# Patient Record
Sex: MALE | Race: WHITE | NOT HISPANIC OR LATINO | Employment: FULL TIME | URBAN - METROPOLITAN AREA
[De-identification: names, ages, dates, MRNs, and addresses within clinical notes are randomized per-mention and may not be internally consistent; named-entity substitution may affect disease eponyms.]

---

## 2017-02-06 ENCOUNTER — OFFICE VISIT (OUTPATIENT)
Dept: URGENT CARE | Facility: PHYSICIAN GROUP | Age: 22
End: 2017-02-06
Payer: COMMERCIAL

## 2017-02-06 VITALS
TEMPERATURE: 98.1 F | WEIGHT: 276 LBS | HEIGHT: 74 IN | OXYGEN SATURATION: 97 % | SYSTOLIC BLOOD PRESSURE: 102 MMHG | DIASTOLIC BLOOD PRESSURE: 72 MMHG | RESPIRATION RATE: 16 BRPM | HEART RATE: 90 BPM | BODY MASS INDEX: 35.42 KG/M2

## 2017-02-06 DIAGNOSIS — J03.90 ACUTE TONSILLITIS, UNSPECIFIED ETIOLOGY: ICD-10-CM

## 2017-02-06 PROCEDURE — 99214 OFFICE O/P EST MOD 30 MIN: CPT | Performed by: FAMILY MEDICINE

## 2017-02-06 RX ORDER — AMOXICILLIN 500 MG/1
500 CAPSULE ORAL 3 TIMES DAILY
Qty: 30 CAP | Refills: 0 | Status: SHIPPED | OUTPATIENT
Start: 2017-02-06

## 2017-02-06 ASSESSMENT — PAIN SCALES - GENERAL: PAINLEVEL: 7=MODERATE-SEVERE PAIN

## 2017-02-06 ASSESSMENT — ENCOUNTER SYMPTOMS
HOARSE VOICE: 0
STRIDOR: 1
SWOLLEN GLANDS: 1

## 2017-02-06 NOTE — PATIENT INSTRUCTIONS
Tonsillitis  Tonsillitis is an infection of the throat that causes the tonsils to become red, tender, and swollen. Tonsils are collections of lymphoid tissue at the back of the throat. Each tonsil has crevices (crypts). Tonsils help fight nose and throat infections and keep infection from spreading to other parts of the body for the first 18 months of life.   CAUSES  Sudden (acute) tonsillitis is usually caused by infection with streptococcal bacteria. Long-lasting (chronic) tonsillitis occurs when the crypts of the tonsils become filled with pieces of food and bacteria, which makes it easy for the tonsils to become repeatedly infected.  SYMPTOMS   Symptoms of tonsillitis include:  · A sore throat, with possible difficulty swallowing.  · White patches on the tonsils.  · Fever.  · Tiredness.  · New episodes of snoring during sleep, when you did not snore before.  · Small, foul-smelling, yellowish-white pieces of material (tonsilloliths) that you occasionally cough up or spit out. The tonsilloliths can also cause you to have bad breath.  DIAGNOSIS  Tonsillitis can be diagnosed through a physical exam. Diagnosis can be confirmed with the results of lab tests, including a throat culture.  TREATMENT   The goals of tonsillitis treatment include the reduction of the severity and duration of symptoms and prevention of associated conditions. Symptoms of tonsillitis can be improved with the use of steroids to reduce the swelling. Tonsillitis caused by bacteria can be treated with antibiotic medicines. Usually, treatment with antibiotic medicines is started before the cause of the tonsillitis is known. However, if it is determined that the cause is not bacterial, antibiotic medicines will not treat the tonsillitis. If attacks of tonsillitis are severe and frequent, your health care provider may recommend surgery to remove the tonsils (tonsillectomy).  HOME CARE INSTRUCTIONS   · Rest as much as possible and get plenty of  sleep.  · Drink plenty of fluids. While the throat is very sore, eat soft foods or liquids, such as sherbet, soups, or instant breakfast drinks.  · Eat frozen ice pops.  · Gargle with a warm or cold liquid to help soothe the throat. Mix 1/4 teaspoon of salt and 1/4 teaspoon of baking soda in 8 oz of water.  SEEK MEDICAL CARE IF:   · Large, tender lumps develop in your neck.  · A rash develops.  · A green, yellow-brown, or bloody substance is coughed up.  · You are unable to swallow liquids or food for 24 hours.  · You notice that only one of the tonsils is swollen.  SEEK IMMEDIATE MEDICAL CARE IF:   · You develop any new symptoms such as vomiting, severe headache, stiff neck, chest pain, or trouble breathing or swallowing.  · You have severe throat pain along with drooling or voice changes.  · You have severe pain, unrelieved with recommended medications.  · You are unable to fully open the mouth.  · You develop redness, swelling, or severe pain anywhere in the neck.  · You have a fever.  MAKE SURE YOU:   · Understand these instructions.  · Will watch your condition.  · Will get help right away if you are not doing well or get worse.     This information is not intended to replace advice given to you by your health care provider. Make sure you discuss any questions you have with your health care provider.     Document Released: 09/27/2006 Document Revised: 01/08/2016 Document Reviewed: 06/06/2014  [employer] Interactive Patient Education ©2016 [employer]vier Inc.

## 2017-02-06 NOTE — MR AVS SNAPSHOT
"        Jaden Sneedfrandy   2017 10:45 AM   Office Visit   MRN: [id number]    Department:  [facility]   Dept Phone:  [phone]    Description:  Male : 1995   Provider:  Malick Holt M.D.           Reason for Visit     Sore Throat swollen tonsils x2 weeks, cough x1 month      Allergies as of 2017     No Known Allergies      You were diagnosed with     Acute tonsillitis, unspecified etiology   [[id number]]         Vital Signs     Blood Pressure Pulse Temperature Respirations Height Weight    102/72 mmHg 90 36.7 °C (98.1 °F) 16 1.88 m (6' 2\") 125.193 kg (276 lb)    Body Mass Index Oxygen Saturation Smoking Status             35.42 kg/m2 97% Former Smoker         Basic Information     Date Of Birth Sex Race Ethnicity Preferred Language    1995 Male White Non- English      Health Maintenance        Date Due Completion Dates    IMM HEP B VACCINE (1 of 3 - Primary Series) 1995 ---    IMM HEP A VACCINE (1 of 2 - Standard Series) 3/3/1994 ---    IMM HPV VACCINE (1 of 3 - Male 3 Dose Series) 3/3/2004 ---    IMM VARICELLA (CHICKENPOX) VACCINE (1 of 2 - 2 Dose Adolescent Series) 3/3/2006 ---    IMM DTaP/Tdap/Td Vaccine (1 - Tdap) 3/3/2012 ---    IMM INFLUENZA (1) 2016 ---            Current Immunizations     No immunizations on file.      Below and/or attached are the medications your provider expects you to take. Review all of your home medications and newly ordered medications with your provider and/or pharmacist. Follow medication instructions as directed by your provider and/or pharmacist. Please keep your medication list with you and share with your provider. Update the information when medications are discontinued, doses are changed, or new medications (including over-the-counter products) are added; and carry medication information at all times in the event of emergency situations     Allergies:  No Known Allergies          Medications  Valid as of: 2017 - 11:14 AM   "    Generic Name Brand Name Tablet Size Instructions for use    Amoxicillin (Cap) AMOXIL 500 MG Take 1 Cap by mouth 3 times a day.        Amoxicillin-Pot Clavulanate (Tab) AUGMENTIN 875-125 MG Take 1 Tab by mouth 2 times a day.        Hydrocodone-Acetaminophen (Tab) NORCO 5-325 MG Take 1-2 Tabs by mouth every 6 hours as needed.        .                 Medicines prescribed today were sent to:     [facility] [address] [place] [address] [place] - [address] [place] [address]    Phone: [phone] Fax: [phone]    Open 24 Hours?: No      Medication refill instructions:       If your prescription bottle indicates you have medication refills left, it is not necessary to call your provider’s office. Please contact your pharmacy and they will refill your medication.    If your prescription bottle indicates you do not have any refills left, you may request refills at any time through one of the following ways: The online [employer] system (except Urgent Care), by calling your provider’s office, or by asking your pharmacy to contact your provider’s office with a refill request. Medication refills are processed only during regular business hours and may not be available until the next business day. Your provider may request additional information or to have a follow-up visit with you prior to refilling your medication.   *Please Note: Medication refills are assigned a new Rx number when refilled electronically. Your pharmacy may indicate that no refills were authorized even though a new prescription for the same medication is available at the pharmacy. Please request the medicine by name with the pharmacy before contacting your provider for a refill.        Instructions    Tonsillitis  Tonsillitis is an infection of the throat that causes the tonsils to become red, tender, and swollen. Tonsils are collections of lymphoid tissue at the back of the throat. Each tonsil has crevices (crypts). Tonsils help  fight nose and throat infections and keep infection from spreading to other parts of the body for the first 18 months of life.   CAUSES  Sudden (acute) tonsillitis is usually caused by infection with streptococcal bacteria. Long-lasting (chronic) tonsillitis occurs when the crypts of the tonsils become filled with pieces of food and bacteria, which makes it easy for the tonsils to become repeatedly infected.  SYMPTOMS   Symptoms of tonsillitis include:  · A sore throat, with possible difficulty swallowing.  · White patches on the tonsils.  · Fever.  · Tiredness.  · New episodes of snoring during sleep, when you did not snore before.  · Small, foul-smelling, yellowish-white pieces of material (tonsilloliths) that you occasionally cough up or spit out. The tonsilloliths can also cause you to have bad breath.  DIAGNOSIS  Tonsillitis can be diagnosed through a physical exam. Diagnosis can be confirmed with the results of lab tests, including a throat culture.  TREATMENT   The goals of tonsillitis treatment include the reduction of the severity and duration of symptoms and prevention of associated conditions. Symptoms of tonsillitis can be improved with the use of steroids to reduce the swelling. Tonsillitis caused by bacteria can be treated with antibiotic medicines. Usually, treatment with antibiotic medicines is started before the cause of the tonsillitis is known. However, if it is determined that the cause is not bacterial, antibiotic medicines will not treat the tonsillitis. If attacks of tonsillitis are severe and frequent, your health care provider may recommend surgery to remove the tonsils (tonsillectomy).  HOME CARE INSTRUCTIONS   · Rest as much as possible and get plenty of sleep.  · Drink plenty of fluids. While the throat is very sore, eat soft foods or liquids, such as sherbet, soups, or instant breakfast drinks.  · Eat frozen ice pops.  · Gargle with a warm or cold liquid to help soothe the throat. Mix  1/4 teaspoon of salt and 1/4 teaspoon of baking soda in 8 oz of water.  SEEK MEDICAL CARE IF:   · Large, tender lumps develop in your neck.  · A rash develops.  · A green, yellow-brown, or bloody substance is coughed up.  · You are unable to swallow liquids or food for 24 hours.  · You notice that only one of the tonsils is swollen.  SEEK IMMEDIATE MEDICAL CARE IF:   · You develop any new symptoms such as vomiting, severe headache, stiff neck, chest pain, or trouble breathing or swallowing.  · You have severe throat pain along with drooling or voice changes.  · You have severe pain, unrelieved with recommended medications.  · You are unable to fully open the mouth.  · You develop redness, swelling, or severe pain anywhere in the neck.  · You have a fever.  MAKE SURE YOU:   · Understand these instructions.  · Will watch your condition.  · Will get help right away if you are not doing well or get worse.     This information is not intended to replace advice given to you by your health care provider. Make sure you discuss any questions you have with your health care provider.     Document Released: 09/27/2006 Document Revised: 01/08/2016 Document Reviewed: 06/06/2014  [employer] Interactive Patient Education ©2016 Elsevier Inc.            SumAll Access Code: [email or web address]  Expires: 3/8/2017 10:43 AM    SumAll  A secure, online tool to manage your health information     [employer]’s SumAll® is a secure, online tool that connects you to your personalized health information from the privacy of your home -- day or night - making it very easy for you to manage your healthcare. Once the activation process is completed, you can even access your medical information using the SumAll tayo, which is available for free in the Apple Tayo store or Google Play store.     SumAll provides the following levels of access (as shown below):   My Chart Features   [facility] Doctor [facility]  Specialists [facility]  Urgent  Care  [facility]  Primary [facility]  Doctor   Email your healthcare team securely and privately 24/7 X X X    Manage appointments: schedule your next appointment; view details of past/upcoming appointments X      Request prescription refills. X      View recent personal medical records, including lab and immunizations X X X X   View health record, including health history, allergies, medications X X X X   Read reports about your outpatient visits, procedures, consult and ER notes X X X X   See your discharge summary, which is a recap of your hospital and/or ER visit that includes your diagnosis, lab results, and care plan. X X       How to register for [employer]:  1. Go to  [email or web address].  2. Click on the Sign Up Now box, which takes you to the New Member Sign Up page. You will need to provide the following information:  a. Enter your [employer] Access Code exactly as it appears at the top of this page. (You will not need to use this code after you’ve completed the sign-up process. If you do not sign up before the expiration date, you must request a new code.)   b. Enter your date of birth.   c. Enter your home email address.   d. Click Submit, and follow the next screen’s instructions.  3. Create a [employer] ID. This will be your [employer] login ID and cannot be changed, so think of one that is secure and easy to remember.  4. Create a [employer] password. You can change your password at any time.  5. Enter your Password Reset Question and Answer. This can be used at a later time if you forget your password.   6. Enter your e-mail address. This allows you to receive e-mail notifications when new information is available in [employer].  7. Click Sign Up. You can now view your health information.    For assistance activating your [employer] account, call [phone]

## 2017-02-06 NOTE — PROGRESS NOTES
"Subjective:      Jaden Caro is a 21 y.o. male who presents with Sore Throat            Pharyngitis   This is a new problem. The current episode started 1 to 4 weeks ago. The problem has been rapidly worsening. The pain is severe. Associated symptoms include stridor and swollen glands. Pertinent negatives include no hoarse voice.       Review of Systems   HENT: Negative for hoarse voice.    Respiratory: Positive for stridor.      No Known Allergies      Objective:     /72 mmHg  Pulse 90  Temp(Src) 36.7 °C (98.1 °F)  Resp 16  Ht 1.88 m (6' 2\")  Wt 125.193 kg (276 lb)  BMI 35.42 kg/m2  SpO2 97%     Physical Exam   Constitutional: He is oriented to person, place, and time. He appears well-developed and well-nourished. No distress.   HENT:   Head: Normocephalic and atraumatic.   Mouth/Throat: Uvula is midline. Oropharyngeal exudate, posterior oropharyngeal edema and posterior oropharyngeal erythema present. No tonsillar abscesses.   Eyes: Conjunctivae and EOM are normal. Pupils are equal, round, and reactive to light.   Cardiovascular: Normal rate, regular rhythm and intact distal pulses.    No murmur heard.  Pulmonary/Chest: Effort normal and breath sounds normal. No respiratory distress.   Abdominal: Soft. He exhibits no distension. There is no tenderness.   Neurological: He is alert and oriented to person, place, and time. He has normal reflexes. No sensory deficit.   Skin: Skin is warm and dry.   Psychiatric: He has a normal mood and affect. His behavior is normal.   Vitals reviewed.              Assessment/Plan:     1. Acute tonsillitis, unspecified etiology  Differential diagnosis, natural history, supportive care, and indications for immediate follow-up discussed.   - amoxicillin (AMOXIL) 500 MG Cap; Take 1 Cap by mouth 3 times a day.  Dispense: 30 Cap; Refill: 0        "

## 2018-06-11 ENCOUNTER — APPOINTMENT (OUTPATIENT)
Dept: RADIOLOGY | Facility: MEDICAL CENTER | Age: 23
End: 2018-06-11
Attending: EMERGENCY MEDICINE
Payer: COMMERCIAL

## 2018-06-11 ENCOUNTER — HOSPITAL ENCOUNTER (EMERGENCY)
Facility: MEDICAL CENTER | Age: 23
End: 2018-06-11
Attending: EMERGENCY MEDICINE
Payer: COMMERCIAL

## 2018-06-11 VITALS
RESPIRATION RATE: 16 BRPM | DIASTOLIC BLOOD PRESSURE: 89 MMHG | HEART RATE: 69 BPM | WEIGHT: 295 LBS | BODY MASS INDEX: 37.86 KG/M2 | SYSTOLIC BLOOD PRESSURE: 132 MMHG | HEIGHT: 74 IN | OXYGEN SATURATION: 97 % | TEMPERATURE: 98.6 F

## 2018-06-11 DIAGNOSIS — M25.531 RIGHT WRIST PAIN: ICD-10-CM

## 2018-06-11 PROCEDURE — 73110 X-RAY EXAM OF WRIST: CPT | Mod: RT

## 2018-06-11 PROCEDURE — 99283 EMERGENCY DEPT VISIT LOW MDM: CPT

## 2018-06-11 NOTE — ED PROVIDER NOTES
ED Provider Note    Scribed for Alonzo Edmond M.D. by Paul Harris. 6/11/2018  2:36 PM    Primary care provider: Pcp Pt States None  Means of arrival: walk in   History obtained from: Patient  History limited by: None    CHIEF COMPLAINT  Chief Complaint   Patient presents with   • Wrist Pain     R, no trauma, pain since Saturday       HPI  Jaden Caro is a 23 y.o. male who presents to the Emergency Department with right wrist pain and swelling worsened with movement onset two days ago. The patient reports that has no history of trauma to the right wrist but reports that he has been using the wrist often as he has been working on building a house. Patient reports that after the pain started he bought a brace which he has been using. He reports that he has been taking some Ibuprofen and maloxicain for his pain. Patient denies any trauma, numbness, tinging at this time.     REVIEW OF SYSTEMS  Pertinent positives include right wrist pain and swelling. Pertinent negatives include no trauma, numbness, tinging.      PAST MEDICAL HISTORY   No pertinent past medical history    SURGICAL HISTORY   has a past surgical history that includes appendectomy (age 10).    SOCIAL HISTORY  Social History   Substance Use Topics   • Smoking status: Former Smoker     Years: 1.00     Types: Cigarettes   • Smokeless tobacco: Never Used   • Alcohol use No      Comment: rare      History   Drug Use No       FAMILY HISTORY  Family History   Problem Relation Age of Onset   • Diabetes Father    • Heart Disease Father    • Stroke Father    • Diabetes Paternal Uncle    • Diabetes Paternal Grandfather    • Cancer Neg Hx        CURRENT MEDICATIONS  Home Medications     Reviewed by Navya Olguin R.N. (Registered Nurse) on 06/11/18 at 1411  Med List Status: Complete   Medication Last Dose Status   amoxicillin (AMOXIL) 500 MG Cap  Active              ALLERGIES  No Known Allergies    RADIOLOGY  DX-WRIST-COMPLETE 3+ RIGHT   Final Result     "  Tiny ossific fragment projecting dorsal to the carpal bones may be related to prior trauma. Triquetral bone avulsion injury could have a similar appearance.        The radiologist's interpretation of all radiological studies have been reviewed by me.    PHYSICAL EXAM  VITAL SIGNS: /75   Pulse 65   Temp 37 °C (98.6 °F)   Resp 18   Ht 1.88 m (6' 2\")   Wt (!) 133.8 kg (295 lb)   SpO2 97%   BMI 37.88 kg/m²     Constitutional: Well developed, Well nourished, no distress, Non-toxic appearance.   HENT: Normocephalic, Atraumatic.  Oropharynx moist.   Eyes: PERRL, EOMI, Conjunctiva normal, No discharge.   CV: Good pulses  Thorax & Lungs: No respiratory distress.   Skin: Warm, Dry, No erythema, No rash.    Musculoskeletal: Mild joint effusion right wrist with tenderness to palpation over dorsum of the wrist and increased pain over ulnar portion of the wrist. No major deformities noted.   Neurologic: Awake, alert. Moves all extremities spontaneously.  Psychiatric: Affect normal, Mood normal.    COURSE & MEDICAL DECISION MAKING  Nursing notes, VS, PMSFHx reviewed in chart.    2:36 PM - Patient seen and examined at bedside. Patient presented here with right wrist pain and swelling with no reported trauma. Ordered right wrist x-ray to evaluate his symptoms. I spoke to the patient about pain control and rotating Motrin and Ibuprofen for a week and then follow up with orthopedics if his symptoms persist. Patient was agreeable with his plan of care. Differential diagnosis include fracture, tendonitis.     4:48 PM I reevaluated the patient at bedside and his wrist showed no fractures. The patient was agreeable with discharge home at this time.     Decision Making:  Wrist sprain, strain, overuse.  X-rays are unremarkable, will put the patient in a Velcro wrist splint, have the patient follow-up with Dr. Crump as an outpatient.    The patient will return for new or worsening symptoms and is stable at the time of " discharge.    The patient is referred to a primary physician for blood pressure management, diabetic screening, and for all other preventative health concerns.    DISPOSITION:  Patient will be discharged home in stable condition.    FOLLOW UP:  [facility], Emergency Dept  [address] [address] [phone]    If symptoms worsen    Mychal Crump M.D.  [address]  [facility] [address]  [phone]          FINAL IMPRESSION  1. Right wrist pain          IPaul (Scribe), am scribing for, and in the presence of, Alonzo Edmond M.D..    Electronically signed by: Paul Harris (Scribe), 6/11/2018    IAlonzo M.D. personally performed the services described in this documentation, as scribed by Paul Harris in my presence, and it is both accurate and complete.    The note accurately reflects work and decisions made by me.  Alonzo Edmond  6/11/2018  9:34 PM

## 2018-06-11 NOTE — ED TRIAGE NOTES
"Chief Complaint   Patient presents with   • Wrist Pain     R, no trauma, pain since Saturday     Pt reports dull ache and shooting pain with movement. Pt wearing splint from home. Denies numbness/tingling.    /75   Pulse 65   Temp 37 °C (98.6 °F)   Resp 18   Ht 1.88 m (6' 2\")   Wt (!) 133.8 kg (295 lb)   SpO2 97%   BMI 37.88 kg/m²     Pt Informed regarding triage process and verbalized understanding to inform triage tech or RN for any changes in condition.  Placed in lobby.    "

## 2018-06-11 NOTE — DISCHARGE INSTRUCTIONS
Wrist Pain, Adult  There are many things that can cause wrist pain. Some common causes include:  · An injury to the wrist area.  · Overuse of the joint.  · A condition that causes too much pressure to be put on a nerve in the wrist (carpal tunnel syndrome).  · Wear and tear of the joints that happens as a person gets older (osteoarthritis).  · Other types of arthritis.  Sometimes, the cause of wrist pain is not known. Often, the pain goes away when you follow your doctor’s instructions for helping pain at home, such as resting or icing your wrist. If your wrist pain does not go away, it is important to tell your doctor.  Follow these instructions at home:  · Rest the wrist area for 48 hours or more, or as long as told by your doctor.  · If a splint or elastic bandage has been put on your wrist, use it as told by your doctor.  ¨ Take off the splint or bandage only as told by your doctor.  ¨ Loosen the splint or bandage if your fingers tingle, lose feeling (get numb), or turn cold or blue.  · If directed, apply ice to the injured area:  ¨ If you have a removable splint or elastic bandage, remove it as told by your doctor.  ¨ Put ice in a plastic bag.  ¨ Place a towel between your skin and the bag or between your splint or bandage and the bag.  ¨ Leave the ice on for 20 minutes, 2-3 times a day.  · Keep your arm raised (elevated) above the level of your heart while you are sitting or lying down.  · Take over-the-counter and prescription medicines only as told by your doctor.  · Keep all follow-up visits as told by your doctor. This is important.  Contact a doctor if:  · You have a sudden sharp pain in the wrist, hand, or arm that is different or new.  · The swelling or bruising on your wrist or hand gets worse.  · Your skin becomes red, gets a rash, or has open sores.  · Your pain does not get better or it gets worse.  Get help right away if:  · You lose feeling in your fingers or hand.  · Your fingers turn white, very  red, or cold and blue.  · You cannot move your fingers.  · You have a fever or chills.  This information is not intended to replace advice given to you by your health care provider. Make sure you discuss any questions you have with your health care provider.  Document Released: 06/05/2009 Document Revised: 07/13/2017 Document Reviewed: 07/06/2017  [employer] Interactive Patient Education © 2017 Elsevier Inc.

## 2018-06-11 NOTE — ED TRIAGE NOTES
Right wrist pain with movement. Pt were a brace that he bought after pain started. Denies injury, but has been using wrist often to build a house.  Mild swelling noted.

## 2019-01-14 ENCOUNTER — OFFICE VISIT (OUTPATIENT)
Dept: URGENT CARE | Facility: PHYSICIAN GROUP | Age: 24
End: 2019-01-14
Payer: COMMERCIAL

## 2019-01-14 ENCOUNTER — HOSPITAL ENCOUNTER (OUTPATIENT)
Facility: MEDICAL CENTER | Age: 24
End: 2019-01-14
Attending: EMERGENCY MEDICINE
Payer: COMMERCIAL

## 2019-01-14 VITALS
TEMPERATURE: 97.3 F | BODY MASS INDEX: 34.01 KG/M2 | HEIGHT: 74 IN | HEART RATE: 91 BPM | WEIGHT: 265 LBS | DIASTOLIC BLOOD PRESSURE: 74 MMHG | OXYGEN SATURATION: 96 % | SYSTOLIC BLOOD PRESSURE: 124 MMHG | RESPIRATION RATE: 12 BRPM

## 2019-01-14 DIAGNOSIS — J02.9 PHARYNGITIS, UNSPECIFIED ETIOLOGY: ICD-10-CM

## 2019-01-14 LAB
INT CON NEG: NEGATIVE
INT CON POS: POSITIVE
S PYO AG THROAT QL: NEGATIVE

## 2019-01-14 PROCEDURE — 87070 CULTURE OTHR SPECIMN AEROBIC: CPT

## 2019-01-14 PROCEDURE — 87880 STREP A ASSAY W/OPTIC: CPT | Performed by: EMERGENCY MEDICINE

## 2019-01-14 PROCEDURE — 99213 OFFICE O/P EST LOW 20 MIN: CPT | Performed by: EMERGENCY MEDICINE

## 2019-01-14 ASSESSMENT — ENCOUNTER SYMPTOMS
STRIDOR: 0
ABDOMINAL PAIN: 0
EYE DISCHARGE: 0
CHILLS: 0
NERVOUS/ANXIOUS: 0
BACK PAIN: 0
NECK PAIN: 1
NAUSEA: 0
VOMITING: 0
SORE THROAT: 1
SPEECH CHANGE: 0
EYE REDNESS: 0
FEVER: 0
SENSORY CHANGE: 0

## 2019-01-14 NOTE — PROGRESS NOTES
"Subjective:      Jaden Caro is a 23 y.o. male who presents with Pharyngitis (x5days )            HPI   Pt with 5 days of sore throat  pmh of strep patient denies any fever chills nausea vomiting or diarrhea.    PMH:  has no past medical history on file.  MEDS:   Current Outpatient Prescriptions:   •  amoxicillin (AMOXIL) 500 MG Cap, Take 1 Cap by mouth 3 times a day. (Patient not taking: Reported on 1/14/2019), Disp: 30 Cap, Rfl: 0  ALLERGIES: No Known Allergies  SURGHX:   Past Surgical History:   Procedure Laterality Date   • APPENDECTOMY  age 10     SOCHX:  reports that he has quit smoking. His smoking use included Cigarettes. He quit after 1.00 year of use. He has never used smokeless tobacco. He reports that he does not drink alcohol or use drugs.  FH: Reviewed with patient, not pertinent to this visit.   Review of Systems   Constitutional: Negative for chills and fever.   HENT: Positive for sore throat. Negative for congestion.    Eyes: Negative for discharge and redness.   Respiratory: Negative for stridor.    Cardiovascular: Negative for chest pain.   Gastrointestinal: Negative for abdominal pain, nausea and vomiting.   Musculoskeletal: Positive for neck pain. Negative for back pain.   Skin: Negative for rash.   Neurological: Negative for sensory change and speech change.   Psychiatric/Behavioral: The patient is not nervous/anxious.           Objective:     /74   Pulse 91   Temp 36.3 °C (97.3 °F) (Temporal)   Resp 12   Ht 1.88 m (6' 2\")   Wt 120.2 kg (265 lb)   SpO2 96%   BMI 34.02 kg/m²      Physical Exam   Constitutional: He appears well-developed and well-nourished. No distress.   HENT:   Head: Normocephalic and atraumatic.   Right Ear: External ear normal.   Eyes: Conjunctivae are normal. Right eye exhibits no discharge. Left eye exhibits no discharge.   Neck: Neck supple.   Cardiovascular: Normal rate and regular rhythm.    Pulmonary/Chest: Effort normal and breath sounds normal. No " stridor.   Abdominal: He exhibits no distension. There is no tenderness.   Musculoskeletal: Normal range of motion.   Lymphadenopathy:     He has cervical adenopathy.   Skin: Skin is warm and dry. No erythema.   Psychiatric: He has a normal mood and affect.   Nursing note reviewed.           Strep neg     Throat culture pending    Mononucleosis titer pending  Assessment/Plan:     Diagnosis: Acute pharyngitis      I am recommending the patient initiate/ continue hydration efforts including the use of a vaporizer/humidifier/ netti pot. I also recommend the pt, initiate Mucinex.. In addition the patient will initiate the prescribed prescription medication/s: Patient will be given lidocaine viscous as needed dysphasia I will call her if her throat culture is positive.  If her symptoms last more than 10 days I recommend she get an outpatient mononucleosis study has been ordered.. If the patient's condition exacerbates with worsening dysphagia, shortness of breath, uncontrolled fever, headache or chest pressure he/she will return immediately to the urgent care or go to  the emergency department for further evaluation.    Jesus Reddy      .

## 2019-01-16 LAB
BACTERIA SPEC RESP CULT: NORMAL
SIGNIFICANT IND 70042: NORMAL
SITE SITE: NORMAL
SOURCE SOURCE: NORMAL

## 2019-03-11 ENCOUNTER — APPOINTMENT (OUTPATIENT)
Dept: RADIOLOGY | Facility: MEDICAL CENTER | Age: 24
End: 2019-03-11
Attending: EMERGENCY MEDICINE
Payer: COMMERCIAL

## 2019-03-11 ENCOUNTER — HOSPITAL ENCOUNTER (EMERGENCY)
Facility: MEDICAL CENTER | Age: 24
End: 2019-03-11
Attending: EMERGENCY MEDICINE
Payer: COMMERCIAL

## 2019-03-11 VITALS
BODY MASS INDEX: 36.89 KG/M2 | HEIGHT: 74 IN | DIASTOLIC BLOOD PRESSURE: 78 MMHG | SYSTOLIC BLOOD PRESSURE: 145 MMHG | RESPIRATION RATE: 17 BRPM | HEART RATE: 95 BPM | TEMPERATURE: 99.9 F | OXYGEN SATURATION: 96 % | WEIGHT: 287.48 LBS

## 2019-03-11 DIAGNOSIS — J18.9 PNEUMONIA DUE TO INFECTIOUS ORGANISM, UNSPECIFIED LATERALITY, UNSPECIFIED PART OF LUNG: ICD-10-CM

## 2019-03-11 DIAGNOSIS — R50.9 FEBRILE ILLNESS, ACUTE: ICD-10-CM

## 2019-03-11 LAB
ALBUMIN SERPL BCP-MCNC: 4.4 G/DL (ref 3.2–4.9)
ALBUMIN/GLOB SERPL: 1.8 G/DL
ALP SERPL-CCNC: 55 U/L (ref 30–99)
ALT SERPL-CCNC: 17 U/L (ref 2–50)
ANION GAP SERPL CALC-SCNC: 8 MMOL/L (ref 0–11.9)
AST SERPL-CCNC: 16 U/L (ref 12–45)
BASOPHILS # BLD AUTO: 0.7 % (ref 0–1.8)
BASOPHILS # BLD: 0.1 K/UL (ref 0–0.12)
BILIRUB SERPL-MCNC: 1.5 MG/DL (ref 0.1–1.5)
BUN SERPL-MCNC: 10 MG/DL (ref 8–22)
CALCIUM SERPL-MCNC: 9 MG/DL (ref 8.5–10.5)
CHLORIDE SERPL-SCNC: 104 MMOL/L (ref 96–112)
CO2 SERPL-SCNC: 25 MMOL/L (ref 20–33)
CREAT SERPL-MCNC: 0.98 MG/DL (ref 0.5–1.4)
EKG IMPRESSION: NORMAL
EOSINOPHIL # BLD AUTO: 0.08 K/UL (ref 0–0.51)
EOSINOPHIL NFR BLD: 0.6 % (ref 0–6.9)
ERYTHROCYTE [DISTWIDTH] IN BLOOD BY AUTOMATED COUNT: 40.2 FL (ref 35.9–50)
GLOBULIN SER CALC-MCNC: 2.5 G/DL (ref 1.9–3.5)
GLUCOSE SERPL-MCNC: 102 MG/DL (ref 65–99)
HCT VFR BLD AUTO: 49.3 % (ref 42–52)
HGB BLD-MCNC: 16.4 G/DL (ref 14–18)
IMM GRANULOCYTES # BLD AUTO: 0.07 K/UL (ref 0–0.11)
IMM GRANULOCYTES NFR BLD AUTO: 0.5 % (ref 0–0.9)
LACTATE BLD-SCNC: 1.4 MMOL/L (ref 0.5–2)
LYMPHOCYTES # BLD AUTO: 1.7 K/UL (ref 1–4.8)
LYMPHOCYTES NFR BLD: 12.1 % (ref 22–41)
MCH RBC QN AUTO: 29.9 PG (ref 27–33)
MCHC RBC AUTO-ENTMCNC: 33.3 G/DL (ref 33.7–35.3)
MCV RBC AUTO: 89.8 FL (ref 81.4–97.8)
MONOCYTES # BLD AUTO: 1.23 K/UL (ref 0–0.85)
MONOCYTES NFR BLD AUTO: 8.7 % (ref 0–13.4)
NEUTROPHILS # BLD AUTO: 10.9 K/UL (ref 1.82–7.42)
NEUTROPHILS NFR BLD: 77.4 % (ref 44–72)
NRBC # BLD AUTO: 0 K/UL
NRBC BLD-RTO: 0 /100 WBC
PLATELET # BLD AUTO: 263 K/UL (ref 164–446)
PMV BLD AUTO: 10.5 FL (ref 9–12.9)
POTASSIUM SERPL-SCNC: 4 MMOL/L (ref 3.6–5.5)
PROT SERPL-MCNC: 6.9 G/DL (ref 6–8.2)
RBC # BLD AUTO: 5.49 M/UL (ref 4.7–6.1)
SODIUM SERPL-SCNC: 137 MMOL/L (ref 135–145)
WBC # BLD AUTO: 14.1 K/UL (ref 4.8–10.8)

## 2019-03-11 PROCEDURE — 700102 HCHG RX REV CODE 250 W/ 637 OVERRIDE(OP): Performed by: EMERGENCY MEDICINE

## 2019-03-11 PROCEDURE — 83605 ASSAY OF LACTIC ACID: CPT

## 2019-03-11 PROCEDURE — 80053 COMPREHEN METABOLIC PANEL: CPT

## 2019-03-11 PROCEDURE — 93005 ELECTROCARDIOGRAM TRACING: CPT

## 2019-03-11 PROCEDURE — 87040 BLOOD CULTURE FOR BACTERIA: CPT

## 2019-03-11 PROCEDURE — 93005 ELECTROCARDIOGRAM TRACING: CPT | Performed by: EMERGENCY MEDICINE

## 2019-03-11 PROCEDURE — 71046 X-RAY EXAM CHEST 2 VIEWS: CPT

## 2019-03-11 PROCEDURE — 85025 COMPLETE CBC W/AUTO DIFF WBC: CPT

## 2019-03-11 PROCEDURE — A9270 NON-COVERED ITEM OR SERVICE: HCPCS | Performed by: EMERGENCY MEDICINE

## 2019-03-11 PROCEDURE — 99284 EMERGENCY DEPT VISIT MOD MDM: CPT

## 2019-03-11 PROCEDURE — 700111 HCHG RX REV CODE 636 W/ 250 OVERRIDE (IP): Performed by: EMERGENCY MEDICINE

## 2019-03-11 RX ORDER — AZITHROMYCIN 250 MG/1
500 TABLET, FILM COATED ORAL ONCE
Status: COMPLETED | OUTPATIENT
Start: 2019-03-11 | End: 2019-03-11

## 2019-03-11 RX ORDER — CEFDINIR 300 MG/1
300 CAPSULE ORAL 2 TIMES DAILY
Qty: 20 CAP | Refills: 0 | Status: SHIPPED | OUTPATIENT
Start: 2019-03-11

## 2019-03-11 RX ORDER — CEFDINIR 300 MG/1
300 CAPSULE ORAL 2 TIMES DAILY
Qty: 20 CAP | Refills: 0 | Status: SHIPPED | OUTPATIENT
Start: 2019-03-11 | End: 2019-03-11

## 2019-03-11 RX ORDER — KETOROLAC TROMETHAMINE 30 MG/ML
30 INJECTION, SOLUTION INTRAMUSCULAR; INTRAVENOUS ONCE
Status: COMPLETED | OUTPATIENT
Start: 2019-03-11 | End: 2019-03-11

## 2019-03-11 RX ORDER — CEFDINIR 300 MG/1
300 CAPSULE ORAL EVERY 12 HOURS
Status: DISCONTINUED | OUTPATIENT
Start: 2019-03-11 | End: 2019-03-11 | Stop reason: HOSPADM

## 2019-03-11 RX ORDER — AZITHROMYCIN 250 MG/1
TABLET, FILM COATED ORAL
Qty: 6 TAB | Refills: 0 | Status: SHIPPED | OUTPATIENT
Start: 2019-03-11

## 2019-03-11 RX ADMIN — AZITHROMYCIN 500 MG: 250 TABLET, FILM COATED ORAL at 07:15

## 2019-03-11 RX ADMIN — KETOROLAC TROMETHAMINE 30 MG: 30 INJECTION, SOLUTION INTRAMUSCULAR; INTRAVENOUS at 07:15

## 2019-03-11 RX ADMIN — CEFDINIR 300 MG: 300 CAPSULE ORAL at 07:15

## 2019-03-11 NOTE — ED PROVIDER NOTES
"ED Provider Note    ED Provider Note      Primary care provider: Pcp Pt States None    CHIEF COMPLAINT  Chief Complaint   Patient presents with   • Chest Pain     x1 month. Sore in nature, 7/10, has worsened over the last month. \"Now it's starting to make me short of breath too.\"    • Cough     Productive cough x1 month   • Fever     x2 days. Up to 102 F at home.        HPI  Jaden Caro is a 24 y.o. male who presents to the Emergency Department with chief complaint of multiple complaints.  Patient reports worsening cough and fever over the last 2 days.  Patient states he has had some slight tach over the last month with exacerbating last 2 days.  Former smoker does not currently smoke he is had no headache altered mental status he has no dyspnea on exertion no exertional chest pain pain throughout his chest is with deep inspiration with cough.  No nausea no vomiting no abdominal pain denies myalgias or sore throat.  No other acute symptoms or concerns at this time.      REVIEW OF SYSTEMS  10 systems reviewed and otherwise negative, pertinent positives and negatives listed in the history of present illness.    PAST MEDICAL HISTORY   Patient denies    SURGICAL HISTORY   has a past surgical history that includes appendectomy (age 10).    SOCIAL HISTORY  Social History   Substance Use Topics   • Smoking status: Former Smoker     Years: 1.00     Types: Cigarettes     Quit date: 2013   • Smokeless tobacco: Never Used   • Alcohol use Yes      Comment: rare      History   Drug Use     Comment: marijuana       FAMILY HISTORY  Non-Contributory    CURRENT MEDICATIONS  Home Medications    **Home medications have not yet been reviewed for this encounter**         ALLERGIES  No Known Allergies    PHYSICAL EXAM  VITAL SIGNS: /91   Pulse (!) 101   Temp 37.7 °C (99.9 °F) (Oral)   Resp 14   Ht 1.88 m (6' 2\")   Wt (!) 130.4 kg (287 lb 7.7 oz)   SpO2 96%   BMI 36.91 kg/m²   Pulse ox interpretation: I interpret this pulse " ox as normal.  Constitutional: Alert and oriented x 3, minimal distress  HEENT: Atraumatic normocephalic, pupils are equal round reactive to light extraocular movements are intact. The nares is clear, external ears are normal, mouth shows moist mucous membranes  Neck: Supple, no JVD no tracheal deviation  Cardiovascular: Tachycardic no murmur rub or gallop 2+ pulses peripherally x4  Thorax & Lungs: No respiratory distress, no wheezes rales or rhonchi, No chest tenderness.   GI: Soft nontender nondistended positive bowel sounds, no peritoneal signs  Skin: Warm dry no acute rash or lesion  Musculoskeletal: Moving all extremities with full range and 5 of 5 strength, no acute  deformity  Neurologic: Cranial nerves III through XII are grossly intact, no sensory deficit, no cerebellar dysfunction   Psychiatric: Appropriate affect for situation at this time      DIAGNOSTIC STUDIES / PROCEDURES  LABS      Results for orders placed or performed during the hospital encounter of 03/11/19   LACTIC ACID   Result Value Ref Range    Lactic Acid 1.4 0.5 - 2.0 mmol/L   CBC WITH DIFFERENTIAL   Result Value Ref Range    WBC 14.1 (H) 4.8 - 10.8 K/uL    RBC 5.49 4.70 - 6.10 M/uL    Hemoglobin 16.4 14.0 - 18.0 g/dL    Hematocrit 49.3 42.0 - 52.0 %    MCV 89.8 81.4 - 97.8 fL    MCH 29.9 27.0 - 33.0 pg    MCHC 33.3 (L) 33.7 - 35.3 g/dL    RDW 40.2 35.9 - 50.0 fL    Platelet Count 263 164 - 446 K/uL    MPV 10.5 9.0 - 12.9 fL    Neutrophils-Polys 77.40 (H) 44.00 - 72.00 %    Lymphocytes 12.10 (L) 22.00 - 41.00 %    Monocytes 8.70 0.00 - 13.40 %    Eosinophils 0.60 0.00 - 6.90 %    Basophils 0.70 0.00 - 1.80 %    Immature Granulocytes 0.50 0.00 - 0.90 %    Nucleated RBC 0.00 /100 WBC    Neutrophils (Absolute) 10.90 (H) 1.82 - 7.42 K/uL    Lymphs (Absolute) 1.70 1.00 - 4.80 K/uL    Monos (Absolute) 1.23 (H) 0.00 - 0.85 K/uL    Eos (Absolute) 0.08 0.00 - 0.51 K/uL    Baso (Absolute) 0.10 0.00 - 0.12 K/uL    Immature Granulocytes (abs) 0.07 0.00  - 0.11 K/uL    NRBC (Absolute) 0.00 K/uL   COMP METABOLIC PANEL   Result Value Ref Range    Sodium 137 135 - 145 mmol/L    Potassium 4.0 3.6 - 5.5 mmol/L    Chloride 104 96 - 112 mmol/L    Co2 25 20 - 33 mmol/L    Anion Gap 8.0 0.0 - 11.9    Glucose 102 (H) 65 - 99 mg/dL    Bun 10 8 - 22 mg/dL    Creatinine 0.98 0.50 - 1.40 mg/dL    Calcium 9.0 8.5 - 10.5 mg/dL    AST(SGOT) 16 12 - 45 U/L    ALT(SGPT) 17 2 - 50 U/L    Alkaline Phosphatase 55 30 - 99 U/L    Total Bilirubin 1.5 0.1 - 1.5 mg/dL    Albumin 4.4 3.2 - 4.9 g/dL    Total Protein 6.9 6.0 - 8.2 g/dL    Globulin 2.5 1.9 - 3.5 g/dL    A-G Ratio 1.8 g/dL   ESTIMATED GFR   Result Value Ref Range    GFR If African American >60 >60 mL/min/1.73 m 2    GFR If Non African American >60 >60 mL/min/1.73 m 2   EKG   Result Value Ref Range    Report       [facility] Emergency Dept.    Test Date:  2019  Pt Name:    SUSAN DEL ROSARIO                Department: ER  MRN:        [id number]                      Room:  Gender:     Male                         Technician: [id number]  :        1995                   Requested By:ER TRIAGE PROTOCOL  Order #:    [id number]                    Reading MD:    Measurements  Intervals                                Axis  Rate:       99                           P:          13  TN:         180                          QRS:        20  QRSD:       76                           T:          11  QT:         324  QTc:        416    Interpretive Statements  SINUS RHYTHM  No previous ECG available for comparison         All labs reviewed by me.      RADIOLOGY  DX-CHEST-2 VIEWS   Final Result      Left perihilar opacity, suspicious for pneumonia.        The radiologist's interpretation of all radiological studies have been reviewed by me.    COURSE & MEDICAL DECISION MAKING  Pertinent Labs & Imaging studies reviewed. (See chart for details)    4:34 AM - Patient seen and examined at bedside.         Patient noted to have  "slightly elevated blood pressure likely circumstantial secondary to presenting complaint. Referred to primary care physician for further evaluation.      Medical Decision Making: Sirs criteria at arrival lactic acid unremarkable white count shows slight leukocytosis.  Vital signs normalized symptomatic management fluids.  Patient given oral Ceftin ear and a azithromycin here will be discharged with the same return for worsening cough shortness of breath chest pain any other acute symptoms or concerns otherwise follow-up with primary care.    /78   Pulse 95   Temp 37.7 °C (99.9 °F) (Oral)   Resp 17   Ht 1.88 m (6' 2\")   Wt (!) 130.4 kg (287 lb 7.7 oz)   SpO2 96%   BMI 36.91 kg/m²     [facility]  [address] [address]  [phone]  Schedule an appointment as soon as possible for a visit   for establishment of primary care, for blood pressure management    [facility], Emergency Dept  [address] [facility] [address] [phone]    If symptoms worsen      Discharge Medication List as of 3/11/2019  7:18 AM      START taking these medications    Details   azithromycin (ZITHROMAX) 250 MG Tab As packaged, Disp-6 Tab, R-0, Print Rx Paper             FINAL IMPRESSION  1. Pneumonia due to infectious organism, unspecified laterality, unspecified part of lung Active   2. Febrile illness, acute Active        This dictation has been created using voice recognition software and/or scribes. The accuracy of the dictation is limited by the abilities of the software and the expertise of the scribes. I expect there may be some errors of grammar and possibly content. I made every attempt to manually correct the errors within my dictation. However, errors related to voice recognition software and/or scribes may still exist and should be interpreted within the appropriate context.            "

## 2019-03-11 NOTE — ED TRIAGE NOTES
"Chief Complaint   Patient presents with   • Chest Pain     x1 month. Sore in nature, 7/10, has worsened over the last month. \"Now it's starting to make me short of breath too.\"    • Cough     Productive cough x1 month   • Fever     x2 days. Up to 102 F at home.      /91   Pulse (!) 102   Temp 37.7 °C (99.9 °F) (Oral)   Resp 14   Ht 1.88 m (6' 2\")   Wt (!) 130.4 kg (287 lb 7.7 oz)   SpO2 96%   BMI 36.91 kg/m²     Pt ambulatory to triage for above, steady on feet. Given mask d/t cough. EKG ordered.   "

## 2019-03-16 LAB
BACTERIA BLD CULT: NORMAL
BACTERIA BLD CULT: NORMAL
SIGNIFICANT IND 70042: NORMAL
SIGNIFICANT IND 70042: NORMAL
SITE SITE: NORMAL
SITE SITE: NORMAL
SOURCE SOURCE: NORMAL
SOURCE SOURCE: NORMAL

## 2019-05-03 ENCOUNTER — HOSPITAL ENCOUNTER (OUTPATIENT)
Dept: RADIOLOGY | Facility: MEDICAL CENTER | Age: 24
End: 2019-05-03
Attending: FAMILY MEDICINE
Payer: COMMERCIAL

## 2019-05-03 ENCOUNTER — OFFICE VISIT (OUTPATIENT)
Dept: URGENT CARE | Facility: PHYSICIAN GROUP | Age: 24
End: 2019-05-03
Payer: COMMERCIAL

## 2019-05-03 VITALS
WEIGHT: 280 LBS | SYSTOLIC BLOOD PRESSURE: 132 MMHG | TEMPERATURE: 98.1 F | HEART RATE: 86 BPM | DIASTOLIC BLOOD PRESSURE: 86 MMHG | HEIGHT: 74 IN | RESPIRATION RATE: 18 BRPM | BODY MASS INDEX: 35.94 KG/M2 | OXYGEN SATURATION: 94 %

## 2019-05-03 DIAGNOSIS — M25.552 PAIN OF LEFT HIP JOINT: ICD-10-CM

## 2019-05-03 PROCEDURE — 72220 X-RAY EXAM SACRUM TAILBONE: CPT

## 2019-05-03 PROCEDURE — 99213 OFFICE O/P EST LOW 20 MIN: CPT | Performed by: FAMILY MEDICINE

## 2019-05-03 PROCEDURE — 73502 X-RAY EXAM HIP UNI 2-3 VIEWS: CPT | Mod: LT

## 2019-05-03 ASSESSMENT — ENCOUNTER SYMPTOMS
HIP PAIN: 1
NUMBNESS: 0
CHILLS: 0
FEVER: 0

## 2019-05-03 NOTE — PATIENT INSTRUCTIONS
Hip Pain  Your hip is the joint between your upper legs and your lower pelvis. The bones, cartilage, tendons, and muscles of your hip joint perform a lot of work each day supporting your body weight and allowing you to move around.  Hip pain can range from a minor ache to severe pain in one or both of your hips. Pain may be felt on the inside of the hip joint near the groin, or the outside near the buttocks and upper thigh. You may have swelling or stiffness as well.  Follow these instructions at home:  · Take medicines only as directed by your health care provider.  · Apply ice to the injured area:  ¨ Put ice in a plastic bag.  ¨ Place a towel between your skin and the bag.  ¨ Leave the ice on for 15-20 minutes at a time, 3-4 times a day.  · Keep your leg raised (elevated) when possible to lessen swelling.  · Avoid activities that cause pain.  · Follow specific exercises as directed by your health care provider.  · Sleep with a pillow between your legs on your most comfortable side.  · Record how often you have hip pain, the location of the pain, and what it feels like.  Contact a health care provider if:  · You are unable to put weight on your leg.  · Your hip is red or swollen or very tender to touch.  · Your pain or swelling continues or worsens after 1 week.  · You have increasing difficulty walking.  · You have a fever.  Get help right away if:  · You have fallen.  · You have a sudden increase in pain and swelling in your hip.  This information is not intended to replace advice given to you by your health care provider. Make sure you discuss any questions you have with your health care provider.  Document Released: 06/07/2011 Document Revised: 05/25/2017 Document Reviewed: 08/14/2014  [employer] Interactive Patient Education © 2017 [employer]

## 2019-05-03 NOTE — PROGRESS NOTES
"Subjective:   Jaden Caro is a 24 y.o. male who presents for Hip Pain (x 4 months ) and Tailbone Pain (x 4 weeks )        Hip Pain   This is a new problem. The current episode started more than 1 month ago. The problem occurs constantly. The problem has been gradually worsening. Pertinent negatives include no chills, fever or numbness.     Review of Systems   Constitutional: Negative for chills and fever.   Neurological: Negative for numbness.     No Known Allergies   Objective:   /86 (BP Cuff Size: Large adult)   Pulse 86   Temp 36.7 °C (98.1 °F)   Resp 18   Ht 1.88 m (6' 2\")   Wt (!) 127 kg (280 lb)   SpO2 94%   BMI 35.95 kg/m²   Physical Exam   Constitutional: He is oriented to person, place, and time. He appears well-developed and well-nourished. No distress.   HENT:   Head: Normocephalic and atraumatic.   Eyes: Pupils are equal, round, and reactive to light. Conjunctivae and EOM are normal.   Cardiovascular: Normal rate, regular rhythm and intact distal pulses.    No murmur heard.  Pulmonary/Chest: Effort normal and breath sounds normal. No respiratory distress.   Abdominal: Soft. He exhibits no distension. There is no tenderness.   Musculoskeletal:        Lumbar back: He exhibits decreased range of motion, tenderness and spasm. He exhibits no bony tenderness.   Neurological: He is alert and oriented to person, place, and time. He has normal reflexes. No sensory deficit.   Skin: Skin is warm and dry.   Psychiatric: He has a normal mood and affect. His behavior is normal.   Vitals reviewed.        Assessment/Plan:   1. Pain of left hip joint  - DX-SACRUM AND COCCYX 2+; Future  - REFERRAL TO ORTHOPEDICS  Use over-the-counter pain reliever, such as acetaminophen (Tylenol), ibuprofen (Advil, Motrin) or naproxen (Aleve) as needed; follow package directions for dosing.   Differential diagnosis, natural history, supportive care, and indications for immediate follow-up discussed.       "

## 2019-08-28 ENCOUNTER — OFFICE VISIT (OUTPATIENT)
Dept: URGENT CARE | Facility: PHYSICIAN GROUP | Age: 24
End: 2019-08-28
Payer: COMMERCIAL

## 2019-08-28 VITALS
HEIGHT: 74 IN | OXYGEN SATURATION: 93 % | RESPIRATION RATE: 16 BRPM | WEIGHT: 263 LBS | BODY MASS INDEX: 33.75 KG/M2 | TEMPERATURE: 97.9 F | HEART RATE: 84 BPM | DIASTOLIC BLOOD PRESSURE: 76 MMHG | SYSTOLIC BLOOD PRESSURE: 128 MMHG

## 2019-08-28 DIAGNOSIS — J22 LRTI (LOWER RESPIRATORY TRACT INFECTION): ICD-10-CM

## 2019-08-28 PROCEDURE — 99214 OFFICE O/P EST MOD 30 MIN: CPT | Performed by: PHYSICIAN ASSISTANT

## 2019-08-28 RX ORDER — DOXYCYCLINE HYCLATE 100 MG
100 TABLET ORAL 2 TIMES DAILY
Qty: 14 TAB | Refills: 0 | Status: SHIPPED | OUTPATIENT
Start: 2019-08-28 | End: 2019-09-04

## 2019-08-28 ASSESSMENT — ENCOUNTER SYMPTOMS
RHINORRHEA: 1
COUGH: 1
SINUS PAIN: 1
SPUTUM PRODUCTION: 1
SHORTNESS OF BREATH: 1
NAUSEA: 0
TINGLING: 0
SENSORY CHANGE: 0
SWEATS: 0
SORE THROAT: 1
HEADACHES: 1
HEMOPTYSIS: 0
WHEEZING: 0
PALPITATIONS: 0
DIARRHEA: 0
FOCAL WEAKNESS: 0
FEVER: 0
VOMITING: 0
CHILLS: 0
ABDOMINAL PAIN: 0
MYALGIAS: 0

## 2019-08-28 NOTE — PROGRESS NOTES
"Subjective:      Jaden Caro is a 24 y.o. male who presents with Cough (chest congestion x 1 week)            Cough   This is a new problem. Episode onset: 1 week  The problem has been gradually worsening. The cough is productive of purulent sputum. Associated symptoms include headaches, nasal congestion, rhinorrhea, a sore throat and shortness of breath. Pertinent negatives include no chest pain, chills, ear congestion, ear pain, fever, hemoptysis, myalgias, postnasal drip, rash, sweats or wheezing. The symptoms are aggravated by lying down. He has tried OTC cough suppressant for the symptoms. His past medical history is significant for pneumonia. There is no history of asthma or bronchitis.         No past medical history on file.  Past Surgical History:   Procedure Laterality Date   • APPENDECTOMY  age 10       Family History   Problem Relation Age of Onset   • Diabetes Father    • Heart Disease Father    • Stroke Father    • Diabetes Paternal Uncle    • Diabetes Paternal Grandfather    • Cancer Neg Hx        No Known Allergies    Medications, Allergies, and current problem list reviewed today in Epic    Review of Systems   Constitutional: Positive for malaise/fatigue. Negative for chills and fever.   HENT: Positive for congestion, rhinorrhea, sinus pain and sore throat. Negative for ear discharge, ear pain and postnasal drip.    Respiratory: Positive for cough, sputum production and shortness of breath. Negative for hemoptysis and wheezing.    Cardiovascular: Negative for chest pain, palpitations and leg swelling.   Gastrointestinal: Negative for abdominal pain, diarrhea, nausea and vomiting.   Musculoskeletal: Negative for myalgias.   Skin: Negative for rash.   Neurological: Positive for headaches. Negative for tingling, sensory change and focal weakness.     All other systems reviewed and are negative.          Objective:     /76   Pulse 84   Temp 36.6 °C (97.9 °F)   Resp 16   Ht 1.88 m (6' 2\")   " Wt 119.3 kg (263 lb)   SpO2 93%   BMI 33.77 kg/m²      Physical Exam   Constitutional: He is oriented to person, place, and time. He appears well-developed and well-nourished. No distress.   HENT:   Head: Normocephalic and atraumatic.   Right Ear: Tympanic membrane, external ear and ear canal normal.   Left Ear: Tympanic membrane, external ear and ear canal normal.   Nose: Mucosal edema and rhinorrhea present.   Mouth/Throat: Uvula is midline, oropharynx is clear and moist and mucous membranes are normal.   Eyes: Conjunctivae are normal.   Neck: Neck supple.   Cardiovascular: Normal rate, regular rhythm and normal heart sounds. Exam reveals no gallop and no friction rub.   No murmur heard.  Pulmonary/Chest: Effort normal. No stridor. No respiratory distress. He has wheezes (mild wheezes in upper lung fields ). He has no rales.   Lymphadenopathy:     He has no cervical adenopathy.   Neurological: He is alert and oriented to person, place, and time. No cranial nerve deficit.   Skin: Skin is warm and dry. No rash noted.   Psychiatric: He has a normal mood and affect. His behavior is normal. Judgment and thought content normal.               Assessment/Plan:     1. LRTI (lower respiratory tract infection)  doxycycline (VIBRAMYCIN) 100 MG Tab         Current Outpatient Medications:   •  doxycycline (VIBRAMYCIN) 100 MG Tab, Take 1 Tab by mouth 2 times a day for 7 days., Disp: 14 Tab, Rfl: 0     Differential diagnoses, Supportive care, and indications for immediate follow-up discussed with patient.   Instructed to return to clinic or nearest emergency department for any change in condition, further concerns, or worsening of symptoms.    The patient demonstrated a good understanding and agreed with the treatment plan.    Samara Cueto P.A.-C.

## 2021-11-09 ENCOUNTER — HOSPITAL ENCOUNTER (OUTPATIENT)
Dept: RADIOLOGY | Facility: MEDICAL CENTER | Age: 26
End: 2021-11-09
Attending: NURSE PRACTITIONER
Payer: COMMERCIAL

## 2021-11-09 ENCOUNTER — OFFICE VISIT (OUTPATIENT)
Dept: URGENT CARE | Facility: PHYSICIAN GROUP | Age: 26
End: 2021-11-09
Payer: COMMERCIAL

## 2021-11-09 VITALS
HEIGHT: 74 IN | HEART RATE: 68 BPM | WEIGHT: 250 LBS | TEMPERATURE: 98.1 F | OXYGEN SATURATION: 99 % | SYSTOLIC BLOOD PRESSURE: 122 MMHG | BODY MASS INDEX: 32.08 KG/M2 | DIASTOLIC BLOOD PRESSURE: 78 MMHG | RESPIRATION RATE: 16 BRPM

## 2021-11-09 DIAGNOSIS — R07.9 CHEST PAIN, UNSPECIFIED TYPE: ICD-10-CM

## 2021-11-09 DIAGNOSIS — R00.2 HEART PALPITATIONS: ICD-10-CM

## 2021-11-09 PROCEDURE — 99214 OFFICE O/P EST MOD 30 MIN: CPT | Performed by: NURSE PRACTITIONER

## 2021-11-09 PROCEDURE — 71046 X-RAY EXAM CHEST 2 VIEWS: CPT

## 2021-11-09 ASSESSMENT — ENCOUNTER SYMPTOMS
EXERTIONAL CHEST PRESSURE: 0
DIZZINESS: 0
NAUSEA: 0
COUGH: 1
FEVER: 0
HEADACHES: 1

## 2021-11-10 ENCOUNTER — HOSPITAL ENCOUNTER (OUTPATIENT)
Dept: LAB | Facility: MEDICAL CENTER | Age: 26
End: 2021-11-10
Attending: NURSE PRACTITIONER
Payer: COMMERCIAL

## 2021-11-10 DIAGNOSIS — R07.9 CHEST PAIN, UNSPECIFIED TYPE: ICD-10-CM

## 2021-11-10 DIAGNOSIS — R00.2 HEART PALPITATIONS: ICD-10-CM

## 2021-11-10 LAB
ALBUMIN SERPL BCP-MCNC: 4.5 G/DL (ref 3.2–4.9)
ALBUMIN/GLOB SERPL: 1.7 G/DL
ALP SERPL-CCNC: 51 U/L (ref 30–99)
ALT SERPL-CCNC: 28 U/L (ref 2–50)
ANION GAP SERPL CALC-SCNC: 13 MMOL/L (ref 7–16)
AST SERPL-CCNC: 18 U/L (ref 12–45)
BASOPHILS # BLD AUTO: 1 % (ref 0–1.8)
BASOPHILS # BLD: 0.11 K/UL (ref 0–0.12)
BILIRUB SERPL-MCNC: 1.4 MG/DL (ref 0.1–1.5)
BUN SERPL-MCNC: 15 MG/DL (ref 8–22)
CALCIUM SERPL-MCNC: 9.3 MG/DL (ref 8.5–10.5)
CHLORIDE SERPL-SCNC: 106 MMOL/L (ref 96–112)
CO2 SERPL-SCNC: 24 MMOL/L (ref 20–33)
CREAT SERPL-MCNC: 1.03 MG/DL (ref 0.5–1.4)
D DIMER PPP IA.FEU-MCNC: 0.34 UG/ML (FEU) (ref 0–0.5)
EOSINOPHIL # BLD AUTO: 0.14 K/UL (ref 0–0.51)
EOSINOPHIL NFR BLD: 1.2 % (ref 0–6.9)
ERYTHROCYTE [DISTWIDTH] IN BLOOD BY AUTOMATED COUNT: 42.2 FL (ref 35.9–50)
GLOBULIN SER CALC-MCNC: 2.6 G/DL (ref 1.9–3.5)
GLUCOSE SERPL-MCNC: 95 MG/DL (ref 65–99)
HCT VFR BLD AUTO: 47.2 % (ref 42–52)
HGB BLD-MCNC: 16.3 G/DL (ref 14–18)
IMM GRANULOCYTES # BLD AUTO: 0.07 K/UL (ref 0–0.11)
IMM GRANULOCYTES NFR BLD AUTO: 0.6 % (ref 0–0.9)
LYMPHOCYTES # BLD AUTO: 3.34 K/UL (ref 1–4.8)
LYMPHOCYTES NFR BLD: 29.3 % (ref 22–41)
MCH RBC QN AUTO: 30.9 PG (ref 27–33)
MCHC RBC AUTO-ENTMCNC: 34.5 G/DL (ref 33.7–35.3)
MCV RBC AUTO: 89.4 FL (ref 81.4–97.8)
MONOCYTES # BLD AUTO: 0.85 K/UL (ref 0–0.85)
MONOCYTES NFR BLD AUTO: 7.4 % (ref 0–13.4)
NEUTROPHILS # BLD AUTO: 6.9 K/UL (ref 1.82–7.42)
NEUTROPHILS NFR BLD: 60.5 % (ref 44–72)
NRBC # BLD AUTO: 0 K/UL
NRBC BLD-RTO: 0 /100 WBC
PLATELET # BLD AUTO: 329 K/UL (ref 164–446)
PMV BLD AUTO: 10.9 FL (ref 9–12.9)
POTASSIUM SERPL-SCNC: 4 MMOL/L (ref 3.6–5.5)
PROT SERPL-MCNC: 7.1 G/DL (ref 6–8.2)
RBC # BLD AUTO: 5.28 M/UL (ref 4.7–6.1)
SODIUM SERPL-SCNC: 143 MMOL/L (ref 135–145)
WBC # BLD AUTO: 11.4 K/UL (ref 4.8–10.8)

## 2021-11-10 PROCEDURE — 36415 COLL VENOUS BLD VENIPUNCTURE: CPT

## 2021-11-10 PROCEDURE — 80053 COMPREHEN METABOLIC PANEL: CPT

## 2021-11-10 PROCEDURE — 85379 FIBRIN DEGRADATION QUANT: CPT

## 2021-11-10 PROCEDURE — 85025 COMPLETE CBC W/AUTO DIFF WBC: CPT

## 2021-11-10 RX ORDER — ALBUTEROL SULFATE 90 UG/1
2 AEROSOL, METERED RESPIRATORY (INHALATION) EVERY 4 HOURS PRN
COMMUNITY
Start: 2021-10-01

## 2021-11-10 NOTE — PROGRESS NOTES
Subjective:     Jaden Caro is a 26 y.o. male who presents for Chest Pain (post covid last month)      Chest Pain   This is a new problem. The current episode started 1 to 4 weeks ago (Approximately 1 month ago Jaden developed intermittent left sided chest pain, shortness of breath and heart palpitations since his COVID infection). The problem occurs 2 to 4 times per day (He notes this will occur at random times throughout the day with rest and exertion). The problem has been unchanged. The pain is at a severity of 2/10. Quality: pinching. The pain does not radiate. Associated symptoms include a cough and headaches. Pertinent negatives include no dizziness, exertional chest pressure, fever, malaise/fatigue or nausea. Associated symptoms comments: Feeling as if his heart is beating out of his chest. .       Review of Systems   Constitutional: Negative for fever and malaise/fatigue.   Respiratory: Positive for cough.    Cardiovascular: Positive for chest pain.   Gastrointestinal: Negative for nausea.   Neurological: Positive for headaches. Negative for dizziness.       PMH: No past medical history on file.  ALLERGIES: No Known Allergies  SURGHX:   Past Surgical History:   Procedure Laterality Date   • APPENDECTOMY  age 10     SOCHX:   Social History     Socioeconomic History   • Marital status: Single     Spouse name: Not on file   • Number of children: Not on file   • Years of education: Not on file   • Highest education level: Not on file   Occupational History   • Not on file   Tobacco Use   • Smoking status: Former Smoker     Years: 1.00     Types: Cigarettes     Quit date:      Years since quittin.8   • Smokeless tobacco: Never Used   Substance and Sexual Activity   • Alcohol use: Yes     Comment: rare   • Drug use: Yes     Comment: marijuana   • Sexual activity: Not on file     Comment: engaged, [employer]   Other Topics Concern   • Not on file   Social History Narrative   • Not on file     Social  "Determinants of Health     Financial Resource Strain:    • Difficulty of Paying Living Expenses: Not on file   Food Insecurity:    • Worried About Running Out of Food in the Last Year: Not on file   • Ran Out of Food in the Last Year: Not on file   Transportation Needs:    • Lack of Transportation (Medical): Not on file   • Lack of Transportation (Non-Medical): Not on file   Physical Activity:    • Days of Exercise per Week: Not on file   • Minutes of Exercise per Session: Not on file   Stress:    • Feeling of Stress : Not on file   Social Connections:    • Frequency of Communication with Friends and Family: Not on file   • Frequency of Social Gatherings with Friends and Family: Not on file   • Attends Voodoo Services: Not on file   • Active Member of Clubs or Organizations: Not on file   • Attends Club or Organization Meetings: Not on file   • Marital Status: Not on file   Intimate Partner Violence:    • Fear of Current or Ex-Partner: Not on file   • Emotionally Abused: Not on file   • Physically Abused: Not on file   • Sexually Abused: Not on file   Housing Stability:    • Unable to Pay for Housing in the Last Year: Not on file   • Number of Places Lived in the Last Year: Not on file   • Unstable Housing in the Last Year: Not on file     FH:   Family History   Problem Relation Age of Onset   • Diabetes Father    • Heart Disease Father    • Stroke Father    • Diabetes Paternal Uncle    • Diabetes Paternal Grandfather    • Cancer Neg Hx          Objective:   /78   Pulse 68   Temp 36.7 °C (98.1 °F)   Resp 16   Ht 1.88 m (6' 2\")   Wt 113 kg (250 lb)   SpO2 99%   BMI 32.10 kg/m²     Physical Exam  Vitals and nursing note reviewed.   Constitutional:       General: He is not in acute distress.     Appearance: Normal appearance. He is not ill-appearing.   HENT:      Head: Normocephalic and atraumatic.      Right Ear: External ear normal.      Left Ear: External ear normal.      Nose: No congestion or " rhinorrhea.      Mouth/Throat:      Mouth: Mucous membranes are moist.   Eyes:      Extraocular Movements: Extraocular movements intact.      Pupils: Pupils are equal, round, and reactive to light.   Cardiovascular:      Rate and Rhythm: Normal rate and regular rhythm.      Pulses: Normal pulses. No decreased pulses.      Heart sounds: Normal heart sounds, S1 normal and S2 normal. No murmur heard.  No friction rub. No gallop.    Pulmonary:      Effort: Pulmonary effort is normal. No respiratory distress.      Breath sounds: Normal breath sounds. No stridor. No wheezing, rhonchi or rales.   Chest:      Chest wall: No tenderness.   Abdominal:      General: Abdomen is flat. Bowel sounds are normal.      Palpations: Abdomen is soft.      Tenderness: There is abdominal tenderness. There is no right CVA tenderness or left CVA tenderness.   Musculoskeletal:         General: Normal range of motion.      Cervical back: Normal range of motion and neck supple. No tenderness.      Right lower leg: No edema.      Left lower leg: No edema.   Lymphadenopathy:      Cervical: No cervical adenopathy.   Skin:     General: Skin is warm and dry.      Capillary Refill: Capillary refill takes less than 2 seconds.   Neurological:      General: No focal deficit present.      Mental Status: He is alert and oriented to person, place, and time. Mental status is at baseline.   Psychiatric:         Mood and Affect: Mood normal.         Behavior: Behavior normal.         Thought Content: Thought content normal.         Judgment: Judgment normal.       DX chest: No acute cardiopulmonary abnormality  ECG: Normal sinus rhythm rate of 64   Assessment/Plan:   Assessment       1. Chest pain, unspecified type  DX-CHEST-2 VIEWS    EKG - Clinic Performed    REFERRAL TO CARDIOLOGY    CBC WITH DIFFERENTIAL    Comp Metabolic Panel    D-DIMER   2. Heart palpitations  DX-CHEST-2 VIEWS    EKG - Clinic Performed    REFERRAL TO CARDIOLOGY    CBC WITH DIFFERENTIAL     Comp Metabolic Panel    D-DIMER     We discussed differential diagnoses.  No abnormal findings in clinic today.  CBC, CMP and D-dimer ordered for evaluation.  He was urgently referred to follow-up with cardiology.  He does have new primary care appointment set up in 1 to 2 weeks.  Patient encouraged to seek treatment in emergency room if symptoms worsen.  The symptoms have been ongoing for the past 4 weeks I do not feel emergency room evaluation is needed at this time.  AVS handout given and reviewed with patient. Pt educated on red flags and when to seek treatment back in ER or UC.

## 2021-11-10 NOTE — PATIENT INSTRUCTIONS
Palpitations  Palpitations are feelings that your heartbeat is irregular or is faster than normal. It may feel like your heart is fluttering or skipping a beat. Palpitations are usually not a serious problem. They may be caused by many things, including smoking, caffeine, alcohol, stress, and certain medicines or drugs. Most causes of palpitations are not serious. However, some palpitations can be a sign of a serious problem. You may need further tests to rule out serious medical problems.  Follow these instructions at home:         Pay attention to any changes in your condition. Take these actions to help manage your symptoms:  Eating and drinking  · Avoid foods and drinks that may cause palpitations. These may include:  ? Caffeinated coffee, tea, soft drinks, diet pills, and energy drinks.  ? Chocolate.  ? Alcohol.  Lifestyle  · Take steps to reduce your stress and anxiety. Things that can help you relax include:  ? Yoga.  ? Mind-body activities, such as deep breathing, meditation, or using words and images to create positive thoughts (guided imagery).  ? Physical activity, such as swimming, jogging, or walking. Tell your health care provider if your palpitations increase with activity. If you have chest pain or shortness of breath with activity, do not continue the activity until you are seen by your health care provider.  ? Biofeedback. This is a method that helps you learn to use your mind to control things in your body, such as your heartbeat.  · Do not use drugs, including cocaine or ecstasy. Do not use marijuana.  · Get plenty of rest and sleep. Keep a regular bed time.  General instructions  · Take over-the-counter and prescription medicines only as told by your health care provider.  · Do not use any products that contain nicotine or tobacco, such as cigarettes and e-cigarettes. If you need help quitting, ask your health care provider.  · Keep all follow-up visits as told by your health care provider. This  is important. These may include visits for further testing if palpitations do not go away or get worse.  Contact a health care provider if you:  · Continue to have a fast or irregular heartbeat after 24 hours.  · Notice that your palpitations occur more often.  Get help right away if you:  · Have chest pain or shortness of breath.  · Have a severe headache.  · Feel dizzy or you faint.  Summary  · Palpitations are feelings that your heartbeat is irregular or is faster than normal. It may feel like your heart is fluttering or skipping a beat.  · Palpitations may be caused by many things, including smoking, caffeine, alcohol, stress, certain medicines, and drugs.  · Although most causes of palpitations are not serious, some causes can be a sign of a serious medical problem.  · Get help right away if you faint or have chest pain, shortness of breath, a severe headache, or dizziness.  This information is not intended to replace advice given to you by your health care provider. Make sure you discuss any questions you have with your health care provider.  Document Released: 12/15/2001 Document Revised: 01/30/2019 Document Reviewed: 01/30/2019  [employer] Patient Education © 2020 [employer]  Chest Pain, Nonspecific  It is often hard to give a specific diagnosis for the cause of chest pain. There is always a chance that your pain could be related to something serious, like a heart attack or a blood clot in the lungs. You need to follow up with your caregiver for further evaluation. More lab tests or other studies such as X-rays, electrocardiography, stress testing, or cardiac imaging may be needed to find the cause of your pain.  Most of the time, nonspecific chest pain improves within 2 to 3 days with rest and mild pain medicine. For the next few days, avoid physical exertion or activities that bring on pain. Do not smoke. Avoid drinking alcohol. Call your caregiver for routine follow-up as advised.   SEEK IMMEDIATE  MEDICAL CARE IF:  · You develop increased chest pain or pain that radiates to the arm, neck, jaw, back, or abdomen.   · You develop shortness of breath, increased coughing, or you start coughing up blood.   · You have severe back or abdominal pain, nausea, or vomiting.   · You develop severe weakness, fainting, fever, or chills.   Document Released: 12/18/2006 Document Revised: 03/11/2013 Document Reviewed: 06/06/2008  [employer] Patient Information ©2013 [email or web address].

## 2021-12-30 ENCOUNTER — TELEPHONE (OUTPATIENT)
Dept: CARDIOLOGY | Facility: PHYSICIAN GROUP | Age: 26
End: 2021-12-30

## 2021-12-30 NOTE — TELEPHONE ENCOUNTER
Called patient to request records for NP appointment with [facility]. Called to confirm if this will be first time patient is seeing a cardiologist and if the patient has had any recent cardiac imaging, testing, or lab work done outside of [facility]. No answer, left voicemail to call back.